# Patient Record
Sex: FEMALE | URBAN - METROPOLITAN AREA
[De-identification: names, ages, dates, MRNs, and addresses within clinical notes are randomized per-mention and may not be internally consistent; named-entity substitution may affect disease eponyms.]

---

## 2021-04-27 ENCOUNTER — APPOINTMENT (OUTPATIENT)
Age: 31
Setting detail: DERMATOLOGY
End: 2021-04-27

## 2021-04-27 VITALS — WEIGHT: 158 LBS | HEIGHT: 64 IN

## 2021-04-27 DIAGNOSIS — D22 MELANOCYTIC NEVI: ICD-10-CM

## 2021-04-27 PROBLEM — D22.4 MELANOCYTIC NEVI OF SCALP AND NECK: Status: ACTIVE | Noted: 2021-04-27

## 2021-04-27 PROCEDURE — OTHER COUNSELING: OTHER

## 2021-04-27 PROCEDURE — OTHER REASSURANCE: OTHER

## 2021-04-27 PROCEDURE — 99202 OFFICE O/P NEW SF 15 MIN: CPT

## 2021-04-27 PROCEDURE — OTHER MIPS QUALITY: OTHER

## 2021-04-27 PROCEDURE — OTHER OBSERVATION: OTHER

## 2021-04-27 ASSESSMENT — LOCATION ZONE DERM: LOCATION ZONE: NECK

## 2021-04-27 ASSESSMENT — LOCATION DETAILED DESCRIPTION DERM
LOCATION DETAILED: LEFT MEDIAL TRAPEZIAL NECK
LOCATION DETAILED: LEFT LATERAL TRAPEZIAL NECK

## 2021-04-27 ASSESSMENT — LOCATION SIMPLE DESCRIPTION DERM: LOCATION SIMPLE: POSTERIOR NECK

## 2021-04-27 NOTE — PROCEDURE: MIPS QUALITY
Quality 110: Preventive Care And Screening: Influenza Immunization: Influenza Immunization not Administered for Documented Reasons.
Detail Level: Detailed
Quality 226: Preventive Care And Screening: Tobacco Use: Screening And Cessation Intervention: Patient screened for tobacco use, is a smoker AND received Cessation Counseling
Quality 402: Tobacco Use And Help With Quitting Among Adolescents: Patient screened for tobacco and is a smoker AND received Cessation Counseling
Quality 111:Pneumonia Vaccination Status For Older Adults: Pneumococcal Vaccination not Administered or Previously Received, Reason not Otherwise Specified

## 2021-04-27 NOTE — HPI: SKIN LESION
What Type Of Note Output Would You Prefer (Optional)?: Standard Output
How Severe Is Your Skin Lesion?: moderate
Has Your Skin Lesion Been Treated?: not been treated
Is This A New Presentation, Or A Follow-Up?: Skin Lesion
Which Family Member (Optional)?: Dad